# Patient Record
Sex: MALE
[De-identification: names, ages, dates, MRNs, and addresses within clinical notes are randomized per-mention and may not be internally consistent; named-entity substitution may affect disease eponyms.]

---

## 2021-09-07 PROBLEM — Z00.00 ENCOUNTER FOR PREVENTIVE HEALTH EXAMINATION: Status: ACTIVE | Noted: 2021-09-07

## 2021-10-13 ENCOUNTER — APPOINTMENT (OUTPATIENT)
Dept: PULMONOLOGY | Facility: CLINIC | Age: 56
End: 2021-10-13
Payer: MEDICAID

## 2021-10-13 VITALS
BODY MASS INDEX: 30.48 KG/M2 | TEMPERATURE: 97.7 F | WEIGHT: 225 LBS | SYSTOLIC BLOOD PRESSURE: 127 MMHG | HEART RATE: 78 BPM | HEIGHT: 72 IN | DIASTOLIC BLOOD PRESSURE: 86 MMHG | OXYGEN SATURATION: 95 %

## 2021-10-13 PROCEDURE — 99204 OFFICE O/P NEW MOD 45 MIN: CPT | Mod: GC

## 2021-10-13 NOTE — PHYSICAL EXAM
[General Appearance - Well Developed] : well developed [Normal Appearance] : normal appearance [General Appearance - Well Nourished] : well nourished [Normal Conjunctiva] : the conjunctiva exhibited no abnormalities [Neck Appearance] : the appearance of the neck was normal [Jugular Venous Distention Increased] : there was no jugular-venous distention [Apical Impulse] : the apical impulse was normal [Heart Rate And Rhythm] : heart rate was normal and rhythm regular [Heart Sounds] : normal S1 and S2 [Respiration, Rhythm And Depth] : normal respiratory rhythm and effort [Exaggerated Use Of Accessory Muscles For Inspiration] : no accessory muscle use [Auscultation Breath Sounds / Voice Sounds] : lungs were clear to auscultation bilaterally [Kyphosis] : kyphosis [Abnormal Walk] : normal gait [Nail Clubbing] : no clubbing of the fingernails [Cyanosis, Localized] : no localized cyanosis [Petechial Hemorrhages (___cm)] : no petechial hemorrhages [Skin Color & Pigmentation] : normal skin color and pigmentation [Skin Turgor] : normal skin turgor [] : no rash [Cranial Nerves] : cranial nerves 2-12 were intact [Deep Tendon Reflexes (DTR)] : deep tendon reflexes were 2+ and symmetric [Oriented To Time, Place, And Person] : oriented to person, place, and time [III] : III [FreeTextEntry2] : no edema

## 2021-10-13 NOTE — HISTORY OF PRESENT ILLNESS
[Snoring] : snoring [Witnessed Apneas] : witnessed sleep apnea [Awakes Unrefreshed] : awakening unrefreshed [Daytime Somnolence] : daytime somnolence [FreeTextEntry1] : 55 M with severe sleep apnea (dx 6/13/21 PSG w. AHI 52.3) presenting for interest in Inspire treatment. He had visited an outside pulmonologist months ago for complaints of snoring and daytime fatigue. He was diagnosed as severe sleep apnea with a PSG and was supposed to receive a CPAP machine. However, months had passed by and he was unable to obtain his CPAP machine despite reaching to his doctor's office multiple times.This prompted him to inquire about the Inspire device after consulting his pulmonologist. \par His main current complaint is daytime fatigue and nonrestorative sleep [Frequent Nocturnal Awakening] : no nocturnal awakening [Unintentional Sleep while Active] : no unintentional sleep while active [Unintentional Sleep While Inactive] : no unintentional sleep while inactive [Awakes with Headache] : no headache upon awakening [Recent  Weight Gain] : no recent weight gain [Awakening With Dry Mouth] : no dry mouth upon awakening [ESS] : 4

## 2021-10-13 NOTE — ASSESSMENT
[FreeTextEntry1] : 57 yo M with severe sleep apnea inquriing about Inspire treatment\par \par 1) Severe NICOLLE - While patient's AHI, BMI, and proportion of central apneas <25% make him eligible for Inspire therapy, he has not tried PAP therapy. We have recommended the patient to contact his pulmonologist to ascertain where the PAP order was stalled. If DME company has not been reached, we will place another order for him.\par In finding the best PAP therapy, we have discussed CPAP titration vs APAP use. Patient preferred the latter and if he is unable to receive the machine from his Pulmonologist, we will be placing an order. Should he fail/ be intolerant of PAP tx, we will reconsider Inspire therapy. The ramifications of obstructive sleep apnea and its potential therapeutic modalities were discussed with the patient. The dangers of drowsy driving were discussed with the patient. The patient was warned to avoid drowsy driving. The patient will followup after results of this study are available.\par